# Patient Record
Sex: FEMALE | Race: OTHER | Employment: FULL TIME | URBAN - METROPOLITAN AREA
[De-identification: names, ages, dates, MRNs, and addresses within clinical notes are randomized per-mention and may not be internally consistent; named-entity substitution may affect disease eponyms.]

---

## 2020-10-25 ENCOUNTER — OFFICE VISIT (OUTPATIENT)
Dept: FAMILY MEDICINE CLINIC | Age: 50
End: 2020-10-25
Payer: COMMERCIAL

## 2020-10-25 ENCOUNTER — HOSPITAL ENCOUNTER (OUTPATIENT)
Age: 50
Setting detail: SPECIMEN
Discharge: HOME | End: 2020-10-25
Payer: COMMERCIAL

## 2020-10-25 VITALS
OXYGEN SATURATION: 97 % | RESPIRATION RATE: 16 BRPM | HEART RATE: 90 BPM | WEIGHT: 175 LBS | HEIGHT: 62 IN | BODY MASS INDEX: 32.2 KG/M2 | TEMPERATURE: 98.1 F

## 2020-10-25 PROCEDURE — 99202 OFFICE O/P NEW SF 15 MIN: CPT | Performed by: NURSE PRACTITIONER

## 2020-10-25 RX ORDER — GLIMEPIRIDE 1 MG/1
TABLET ORAL
COMMUNITY
Start: 2020-09-04

## 2020-10-25 RX ORDER — LORATADINE 10 MG/1
10 TABLET ORAL DAILY
COMMUNITY
Start: 2020-08-11

## 2020-10-25 RX ORDER — BENZONATATE 200 MG/1
200 CAPSULE ORAL 3 TIMES DAILY PRN
Qty: 30 CAPSULE | Refills: 0 | Status: SHIPPED | OUTPATIENT
Start: 2020-10-25 | End: 2020-11-01

## 2020-10-25 ASSESSMENT — PATIENT HEALTH QUESTIONNAIRE - PHQ9
SUM OF ALL RESPONSES TO PHQ QUESTIONS 1-9: 0
1. LITTLE INTEREST OR PLEASURE IN DOING THINGS: 0
SUM OF ALL RESPONSES TO PHQ QUESTIONS 1-9: 0
SUM OF ALL RESPONSES TO PHQ QUESTIONS 1-9: 0
2. FEELING DOWN, DEPRESSED OR HOPELESS: 0
SUM OF ALL RESPONSES TO PHQ9 QUESTIONS 1 & 2: 0

## 2020-10-25 ASSESSMENT — ENCOUNTER SYMPTOMS
RHINORRHEA: 1
VOMITING: 0
SINUS PAIN: 0
NAUSEA: 0
SHORTNESS OF BREATH: 0
SINUS PRESSURE: 0
SORE THROAT: 1
COUGH: 1

## 2020-10-25 NOTE — PATIENT INSTRUCTIONS

## 2020-10-25 NOTE — PROGRESS NOTES
[address]  [address] [facility] [address] [address]  Dept: [phone]  Dept Fax: [phone]    Camilo Allen is a 53 y.o. female who presents to the urgent care today for her medicalconditions/complaints as noted below. Camilo Allen is c/o of Congestion (x2 days ); Cough; and Sinus Problem      HPI:     67-year-old female patient presents with complaint of sore throat, congestion, cough. Patient has had symptoms for approximately 2 to 3 days. Reports exposure to her  who is known to be positive for COVID-19 and daughter with suspected symptoms. Reports sore throat scratchy worse with swallowing. Reports cough is mostly dry and nonproductive. Reports nasal congestion and rhinorrhea. Additional symptoms include generalized chills, fatigue, headache. Denies fever. Denies chest pain or shortness of breath. Denies vomiting or diarrhea. Denies loss of taste or smell. Treatments tried include over-the-counter regimen. Past Medical History:   Diagnosis Date    Anemia     during pregnancy    Diabetes mellitus ([facility] [address]     during pregnancy    Endometriosis     Hypertension     Polycystic ovarian syndrome     hx    Type II or unspecified type diabetes mellitus without mention of complication, not stated as uncontrolled         Current Outpatient Medications   Medication Sig Dispense Refill    glimepiride (AMARYL) 1 MG tablet TAKE 1 TABLET BY MOUTH EVERY MORNING BEFORE BREAKFAST      loratadine (CLARITIN) 10 MG tablet Take 10 mg by mouth daily      benzonatate (TESSALON) 200 MG capsule Take 1 capsule by mouth 3 times daily as needed for Cough 30 capsule 0    metformin (GLUCOPHAGE) 500 MG tablet Take 500 mg by mouth daily (with breakfast).  ibuprofen (IBU) 800 MG tablet Take 1 tablet by mouth every 8 hours as needed for Pain. 30 tablet 0     No current facility-administered medications for this visit.       Allergies   Allergen Reactions    Codeine Anaphylaxis and Hives       Subjective:      Review of Systems   Constitutional: Positive for chills and fatigue. Negative for fever. HENT: Positive for congestion, rhinorrhea and sore throat. Negative for ear pain, postnasal drip, sinus pressure and sinus pain. Respiratory: Positive for cough. Negative for shortness of breath. Cardiovascular: Negative for chest pain. Gastrointestinal: Negative for nausea and vomiting. Neurological: Positive for headaches. All other systems reviewed and are negative. Objective:     Physical Exam  Vitals signs and nursing note reviewed. Constitutional:       General: She is not in acute distress. Appearance: Normal appearance. She is not toxic-appearing. HENT:      Nose: Congestion and rhinorrhea present. Mouth/Throat:      Mouth: Mucous membranes are moist.      Pharynx: No posterior oropharyngeal erythema. Cardiovascular:      Rate and Rhythm: Normal rate. Pulmonary:      Effort: Pulmonary effort is normal. No respiratory distress. Breath sounds: Normal breath sounds. Skin:     General: Skin is warm and dry. Neurological:      General: No focal deficit present. Mental Status: She is alert and oriented to person, place, and time. Pulse 90   Temp 98.1 °F (36.7 °C)   Resp 16   Ht 5' 2\" (1.575 m)   Wt 175 lb (79.4 kg)   LMP 10/02/2012   SpO2 97%   BMI 32.01 kg/m²   Lab Review   No visits with results within 2 Month(s) from this visit. Latest known visit with results is:   No results found for any previous visit. Assessment:       Diagnosis Orders   1. Cough with exposure to COVID-19 virus  benzonatate (TESSALON) 200 MG capsule    COVID-19 Ambulatory       Plan:      Return if symptoms worsen or fail to improve.     Orders Placed This Encounter   Medications    benzonatate (TESSALON) 200 MG capsule     Sig: Take 1 capsule by mouth 3 times daily as needed for Cough     Dispense:  30 capsule Refill:  0         Discussed Tessalon dose/duration  Recommend isolation pending covid results. Discussed treatment regimen to include rest, hydration, tylenol prn. Discussed deep breathing exercises. Discussed to monitor for progression of symptoms. Follow up as needed. Will contact patient for results      Patient given educational materials - see patient instructions. Discussed use, benefit, and side effects of prescribed medications. All patientquestions answered. Pt voiced understanding. This note was transcribed using dictation with Dragon services. Efforts were made to correct any errors but some words may be misinterpreted.      Electronically signed by MADHAVI Chand CNP on 10/25/2020at 2:39 PM

## 2020-10-25 NOTE — LETTER
[address] [place] [address]  [place] [address]  Phone: [phone]  Fax: [phone]    MADHAVI Olivares CNP        October 25, 2020     Patient: Irish Kohler   YOB: 1970   Date of Visit: 10/25/2020       To Whom It May Concern: It is my medical opinion that Starcj Michelion Is excused pending COVID results. If you have any questions or concerns, please don't hesitate to call.     Sincerely,        MADHAVI Olivares CNP

## 2020-10-30 LAB — SARS-COV-2, NAA: NORMAL

## 2020-10-31 ENCOUNTER — NURSE ONLY (OUTPATIENT)
Dept: FAMILY MEDICINE CLINIC | Age: 50
End: 2020-10-31

## 2020-10-31 ENCOUNTER — HOSPITAL ENCOUNTER (OUTPATIENT)
Age: 50
Setting detail: SPECIMEN
Discharge: HOME | End: 2020-10-31
Payer: COMMERCIAL

## 2020-11-02 LAB — SARS-COV-2, NAA: NOT DETECTED

## 2023-09-05 ENCOUNTER — TELEPHONE (OUTPATIENT)
Dept: GASTROENTEROLOGY | Age: 53
End: 2023-09-05

## 2023-09-05 NOTE — TELEPHONE ENCOUNTER
Writer rec'd referral from PCP asking to schedule pt for colon screen, writer called pt to schedule, pt did not answer, Maria Clifton asking for a return call. 1st attempt; called and LVM for patient regarding colon screen referral.     2nd attempt; mailed colon screen letter to patient's home address.